# Patient Record
Sex: MALE | Race: WHITE | NOT HISPANIC OR LATINO | Employment: OTHER | URBAN - METROPOLITAN AREA
[De-identification: names, ages, dates, MRNs, and addresses within clinical notes are randomized per-mention and may not be internally consistent; named-entity substitution may affect disease eponyms.]

---

## 2019-11-06 ENCOUNTER — HOSPITAL ENCOUNTER (EMERGENCY)
Facility: MEDICAL CENTER | Age: 64
End: 2019-11-06
Attending: EMERGENCY MEDICINE

## 2019-11-06 VITALS
SYSTOLIC BLOOD PRESSURE: 158 MMHG | BODY MASS INDEX: 27.15 KG/M2 | OXYGEN SATURATION: 95 % | DIASTOLIC BLOOD PRESSURE: 93 MMHG | TEMPERATURE: 97.4 F | HEIGHT: 77 IN | WEIGHT: 229.94 LBS | HEART RATE: 58 BPM | RESPIRATION RATE: 16 BRPM

## 2019-11-06 DIAGNOSIS — M25.50 ARTHRALGIA, UNSPECIFIED JOINT: ICD-10-CM

## 2019-11-06 LAB
ALBUMIN SERPL BCP-MCNC: 4.4 G/DL (ref 3.2–4.9)
ALBUMIN/GLOB SERPL: 1.1 G/DL
ALP SERPL-CCNC: 90 U/L (ref 30–99)
ALT SERPL-CCNC: 13 U/L (ref 2–50)
ANION GAP SERPL CALC-SCNC: 8 MMOL/L (ref 0–11.9)
AST SERPL-CCNC: 18 U/L (ref 12–45)
BASOPHILS # BLD AUTO: 1.2 % (ref 0–1.8)
BASOPHILS # BLD: 0.1 K/UL (ref 0–0.12)
BILIRUB SERPL-MCNC: 0.6 MG/DL (ref 0.1–1.5)
BUN SERPL-MCNC: 11 MG/DL (ref 8–22)
CALCIUM SERPL-MCNC: 9.7 MG/DL (ref 8.5–10.5)
CHLORIDE SERPL-SCNC: 100 MMOL/L (ref 96–112)
CK SERPL-CCNC: 54 U/L (ref 0–154)
CO2 SERPL-SCNC: 25 MMOL/L (ref 20–33)
CREAT SERPL-MCNC: 0.84 MG/DL (ref 0.5–1.4)
CRP SERPL HS-MCNC: 2.83 MG/DL (ref 0–0.75)
EKG IMPRESSION: NORMAL
EOSINOPHIL # BLD AUTO: 0.36 K/UL (ref 0–0.51)
EOSINOPHIL NFR BLD: 4.2 % (ref 0–6.9)
ERYTHROCYTE [DISTWIDTH] IN BLOOD BY AUTOMATED COUNT: 41.5 FL (ref 35.9–50)
ERYTHROCYTE [SEDIMENTATION RATE] IN BLOOD BY WESTERGREN METHOD: 47 MM/HOUR (ref 0–20)
GLOBULIN SER CALC-MCNC: 4 G/DL (ref 1.9–3.5)
GLUCOSE SERPL-MCNC: 96 MG/DL (ref 65–99)
HCT VFR BLD AUTO: 47.5 % (ref 42–52)
HGB BLD-MCNC: 15.8 G/DL (ref 14–18)
IMM GRANULOCYTES # BLD AUTO: 0.04 K/UL (ref 0–0.11)
IMM GRANULOCYTES NFR BLD AUTO: 0.5 % (ref 0–0.9)
LYMPHOCYTES # BLD AUTO: 1.23 K/UL (ref 1–4.8)
LYMPHOCYTES NFR BLD: 14.4 % (ref 22–41)
MCH RBC QN AUTO: 31.3 PG (ref 27–33)
MCHC RBC AUTO-ENTMCNC: 33.3 G/DL (ref 33.7–35.3)
MCV RBC AUTO: 94.2 FL (ref 81.4–97.8)
MONOCYTES # BLD AUTO: 1.04 K/UL (ref 0–0.85)
MONOCYTES NFR BLD AUTO: 12.1 % (ref 0–13.4)
NEUTROPHILS # BLD AUTO: 5.8 K/UL (ref 1.82–7.42)
NEUTROPHILS NFR BLD: 67.6 % (ref 44–72)
NRBC # BLD AUTO: 0 K/UL
NRBC BLD-RTO: 0 /100 WBC
PLATELET # BLD AUTO: 349 K/UL (ref 164–446)
PMV BLD AUTO: 9.7 FL (ref 9–12.9)
POTASSIUM SERPL-SCNC: 4.4 MMOL/L (ref 3.6–5.5)
PROT SERPL-MCNC: 8.4 G/DL (ref 6–8.2)
RBC # BLD AUTO: 5.04 M/UL (ref 4.7–6.1)
SODIUM SERPL-SCNC: 133 MMOL/L (ref 135–145)
TROPONIN T SERPL-MCNC: <6 NG/L (ref 6–19)
TSH SERPL DL<=0.005 MIU/L-ACNC: 4.58 UIU/ML (ref 0.38–5.33)
WBC # BLD AUTO: 8.6 K/UL (ref 4.8–10.8)

## 2019-11-06 PROCEDURE — 82550 ASSAY OF CK (CPK): CPT

## 2019-11-06 PROCEDURE — 80053 COMPREHEN METABOLIC PANEL: CPT

## 2019-11-06 PROCEDURE — 84484 ASSAY OF TROPONIN QUANT: CPT

## 2019-11-06 PROCEDURE — 93005 ELECTROCARDIOGRAM TRACING: CPT

## 2019-11-06 PROCEDURE — 700111 HCHG RX REV CODE 636 W/ 250 OVERRIDE (IP): Performed by: EMERGENCY MEDICINE

## 2019-11-06 PROCEDURE — 86140 C-REACTIVE PROTEIN: CPT

## 2019-11-06 PROCEDURE — 84443 ASSAY THYROID STIM HORMONE: CPT

## 2019-11-06 PROCEDURE — 85025 COMPLETE CBC W/AUTO DIFF WBC: CPT

## 2019-11-06 PROCEDURE — 93005 ELECTROCARDIOGRAM TRACING: CPT | Performed by: EMERGENCY MEDICINE

## 2019-11-06 PROCEDURE — 85652 RBC SED RATE AUTOMATED: CPT

## 2019-11-06 PROCEDURE — 36415 COLL VENOUS BLD VENIPUNCTURE: CPT

## 2019-11-06 PROCEDURE — 99284 EMERGENCY DEPT VISIT MOD MDM: CPT

## 2019-11-06 RX ORDER — PREDNISONE 10 MG/1
15 TABLET ORAL ONCE
Status: COMPLETED | OUTPATIENT
Start: 2019-11-06 | End: 2019-11-06

## 2019-11-06 RX ORDER — PREDNISONE 10 MG/1
15 TABLET ORAL DAILY
Qty: 11 TAB | Refills: 0 | Status: SHIPPED | OUTPATIENT
Start: 2019-11-06 | End: 2019-11-13

## 2019-11-06 RX ADMIN — PREDNISONE 15 MG: 10 TABLET ORAL at 14:05

## 2019-11-06 SDOH — HEALTH STABILITY: MENTAL HEALTH: HOW OFTEN DO YOU HAVE 6 OR MORE DRINKS ON ONE OCCASION?: DAILY OR ALMOST DAILY

## 2019-11-06 SDOH — HEALTH STABILITY: MENTAL HEALTH: HOW MANY STANDARD DRINKS CONTAINING ALCOHOL DO YOU HAVE ON A TYPICAL DAY?: 1 OR 2

## 2019-11-06 ASSESSMENT — LIFESTYLE VARIABLES: DO YOU DRINK ALCOHOL: YES

## 2019-11-06 NOTE — ED NOTES
Discharge instructions given.  All questions answered.  Prescriptions given x1.  Pt to follow-up with PCP, return to ER if symptoms worsen as discussed.  Pt verbalized understanding.  All belongings with pt.  Pt ambulated to lobby.

## 2019-11-06 NOTE — ED TRIAGE NOTES
Ambulatory to triage from EKG with   Chief Complaint   Patient presents with   • Hip Pain   • Shoulder Pain   • Hand Pain   Above complaints x 1 months. Unable to get into PCP until January. States bilateral shoulder, hand, and hip pain. Tearful. Denies chest pain or SOB.

## 2019-11-06 NOTE — ED NOTES
Pt ambulated to yellow 64.  Agree with triage note.  Pt appears uncomfortable.  Pt placed on monitors and changed into gown.  ERP to see.

## 2019-11-06 NOTE — ED PROVIDER NOTES
ED Provider Note    ER PROVIDER NOTE      CHIEF COMPLAINT  Chief Complaint   Patient presents with   • Hip Pain   • Shoulder Pain   • Hand Pain       HPI  Sahun Daniels is a 64 y.o. male who presents to the emergency department complaining of bilateral shoulder and hip pain.  Patient reports of the last month he has had progressive pain to his hips and shoulders, that has become quite severe.  He denies any injury, denies any real acute changes just seems to be getting worse, he denies any focal weakness numbness or tingling he does sometimes get some pain into his hands.  Seems to be worse and more stiff in the mornings. he denies any fevers or chills.  No rashes.  No chest pain or shortness of breath.  No abdominal pain, nausea vomiting.  He denies any headaches or visual symptoms.  Patient states it is gotten to the point where he can barely raise his arms above his shoulders due to the pain    REVIEW OF SYSTEMS  Pertinent positives include bilateral hip and shoulder pain. Pertinent negatives include no chest pain or fever. See HPI for details. All other systems reviewed and are negative.    PAST MEDICAL HISTORY   has a past medical history of ACE-inhibitor cough (1/15/2013), Chest tightness, discomfort, or pressure (9/18/2012), HTN (hypertension) with goal to be determined (9/18/2012), and Palpitations (9/18/2012).    SURGICAL HISTORY   has a past surgical history that includes lumbar laminectomy diskectomy and knee arthroscopy.    FAMILY HISTORY  Family History   Problem Relation Age of Onset   • Heart Disease Brother    • Heart Disease Brother        SOCIAL HISTORY  Social History     Socioeconomic History   • Marital status:      Spouse name: Not on file   • Number of children: Not on file   • Years of education: Not on file   • Highest education level: Not on file   Occupational History   • Not on file   Social Needs   • Financial resource strain: Not on file   • Food insecurity:     Worry: Not on file  "    Inability: Not on file   • Transportation needs:     Medical: Not on file     Non-medical: Not on file   Tobacco Use   • Smoking status: Former Smoker     Packs/day: 0.50     Types: Cigarettes     Last attempt to quit: 1975     Years since quittin.1   • Smokeless tobacco: Current User     Types: Chew   Substance and Sexual Activity   • Alcohol use: Yes     Drinks per session: 1 or 2     Binge frequency: Daily or almost daily   • Drug use: Yes     Types: Inhaled     Comment: pot   • Sexual activity: Not on file   Lifestyle   • Physical activity:     Days per week: Not on file     Minutes per session: Not on file   • Stress: Not on file   Relationships   • Social connections:     Talks on phone: Not on file     Gets together: Not on file     Attends [employer] service: Not on file     Active member of club or organization: Not on file     Attends meetings of clubs or organizations: Not on file     Relationship status: Not on file   • Intimate partner violence:     Fear of current or ex partner: Not on file     Emotionally abused: Not on file     Physically abused: Not on file     Forced sexual activity: Not on file   Other Topics Concern   • Not on file   Social History Narrative   • Not on file      Social History     Substance and Sexual Activity   Drug Use Yes   • Types: Inhaled    Comment: pot       CURRENT MEDICATIONS  Home Medications     Reviewed by Moon Pugh R.N. (Registered Nurse) on 19 at 1130  Med List Status: Complete   Medication Last Dose Status   hydrochlorothiazide (HYDRODIURIL) 25 MG TABS not taking Active                ALLERGIES  No Known Allergies    PHYSICAL EXAM  VITAL SIGNS: /85   Pulse (!) 57   Temp 36.3 °C (97.4 °F) (Oral)   Resp 16   Ht 1.956 m (6' 5\")   Wt 104.3 kg (229 lb 15 oz)   SpO2 94%   BMI 27.27 kg/m²   Pulse ox interpretation: I interpret this pulse ox as normal.    Constitutional: Alert tearful, uncomfortable  HENT: No signs of trauma, " Bilateral external ears normal, Nose normal.   Eyes: Pupils are equal and reactive, Conjunctiva normal, Non-icteric.   Neck: Normal range of motion, No tenderness, Supple, No stridor.   Lymphatic: No lymphadenopathy noted.   Cardiovascular: Regular rate and rhythm, no murmurs.   Thorax & Lungs: Normal breath sounds, No respiratory distress, No wheezing, No chest tenderness.   Abdomen: Bowel sounds normal, Soft, No tenderness, No masses, No pulsatile masses. No peritoneal signs.  Skin: Warm, Dry, No erythema, No rash.   Back: No bony tenderness, No CVA tenderness.   Extremities: Intact distal pulses, No edema, No tenderness, No cyanosis,   Musculoskeletal: Passive range of motion through wrist and elbows without pain, patient has pain with passive and active range of motion in shoulders when trying to fully Abduct, full range of motion, passive and active and knees and ankles, some pain with passive and active range of motion in hips, good range of motion in all major joints. No tenderness to palpation or major deformities noted.   Neurologic: Alert , Normal motor function, Normal sensory function, No focal deficits noted.   Psychiatric: Affect normal, Judgment normal, Mood normal.     DIAGNOSTIC STUDIES / PROCEDURES    Results for orders placed or performed during the hospital encounter of 11/06/19   CBC WITH DIFFERENTIAL   Result Value Ref Range    WBC 8.6 4.8 - 10.8 K/uL    RBC 5.04 4.70 - 6.10 M/uL    Hemoglobin 15.8 14.0 - 18.0 g/dL    Hematocrit 47.5 42.0 - 52.0 %    MCV 94.2 81.4 - 97.8 fL    MCH 31.3 27.0 - 33.0 pg    MCHC 33.3 (L) 33.7 - 35.3 g/dL    RDW 41.5 35.9 - 50.0 fL    Platelet Count 349 164 - 446 K/uL    MPV 9.7 9.0 - 12.9 fL    Neutrophils-Polys 67.60 44.00 - 72.00 %    Lymphocytes 14.40 (L) 22.00 - 41.00 %    Monocytes 12.10 0.00 - 13.40 %    Eosinophils 4.20 0.00 - 6.90 %    Basophils 1.20 0.00 - 1.80 %    Immature Granulocytes 0.50 0.00 - 0.90 %    Nucleated RBC 0.00 /100 WBC    Neutrophils  (Absolute) 5.80 1.82 - 7.42 K/uL    Lymphs (Absolute) 1.23 1.00 - 4.80 K/uL    Monos (Absolute) 1.04 (H) 0.00 - 0.85 K/uL    Eos (Absolute) 0.36 0.00 - 0.51 K/uL    Baso (Absolute) 0.10 0.00 - 0.12 K/uL    Immature Granulocytes (abs) 0.04 0.00 - 0.11 K/uL    NRBC (Absolute) 0.00 K/uL   COMP METABOLIC PANEL   Result Value Ref Range    Sodium 133 (L) 135 - 145 mmol/L    Potassium 4.4 3.6 - 5.5 mmol/L    Chloride 100 96 - 112 mmol/L    Co2 25 20 - 33 mmol/L    Anion Gap 8.0 0.0 - 11.9    Glucose 96 65 - 99 mg/dL    Bun 11 8 - 22 mg/dL    Creatinine 0.84 0.50 - 1.40 mg/dL    Calcium 9.7 8.5 - 10.5 mg/dL    AST(SGOT) 18 12 - 45 U/L    ALT(SGPT) 13 2 - 50 U/L    Alkaline Phosphatase 90 30 - 99 U/L    Total Bilirubin 0.6 0.1 - 1.5 mg/dL    Albumin 4.4 3.2 - 4.9 g/dL    Total Protein 8.4 (H) 6.0 - 8.2 g/dL    Globulin 4.0 (H) 1.9 - 3.5 g/dL    A-G Ratio 1.1 g/dL   TROPONIN   Result Value Ref Range    Troponin T <6 6 - 19 ng/L   WESTERGREN SED RATE   Result Value Ref Range    Sed Rate Westergren 47 (H) 0 - 20 mm/hour   CRP QUANTITIVE (NON-CARDIAC)   Result Value Ref Range    Stat C-Reactive Protein 2.83 (H) 0.00 - 0.75 mg/dL   CREATINE KINASE   Result Value Ref Range    CPK Total 54 0 - 154 U/L   TSH   Result Value Ref Range    TSH 4.580 0.380 - 5.330 uIU/mL   ESTIMATED GFR   Result Value Ref Range    GFR If African American >60 >60 mL/min/1.73 m 2    GFR If Non African American >60 >60 mL/min/1.73 m 2   EKG (NOW)   Result Value Ref Range    Report       [facility] Emergency Dept.    Test Date:  2019  Pt Name:    ALEX MARIE                 Department: ER  MRN:        [id number]                      Room:  Gender:     Male                         Technician: [id number]  :        1955                   Requested By:ER TRIAGE PROTOCOL  Order #:    [id number]                    Reading MD:    Measurements  Intervals                                Axis  Rate:       50                           P:           50  ND:         156                          QRS:        25  QRSD:       103                          T:          52  QT:         452  QTc:        413    Interpretive Statements  Sinus bradycardia  Atrial premature complex  Nonspecific T abnormalities, lateral leads  No previous ECG available for comparison         RADIOLOGY  No orders to display     The radiologist's interpretation of all radiological studies have been reviewed by me.    COURSE & MEDICAL DECISION MAKING  Nursing notes, VS, PMSFHx reviewed in chart.    12:13 PM Patient seen and examined at bedside.  Ordered for CBC, CMP, CK, ESR, CRP, ecg, trop to evaluate his symptoms.     1:53 PM  Patient reevaluated he is much more comfortable at this time, blood pressure is improved, discussed results and plan for discharge        Decision Making:  This is a 64 y.o. male presented with bilateral hip and shoulder pain.  He does have some morning stiffness as well as difficulty abducting his shoulders due to the pain, symptoms all the seem to be consistent with polymyalgia rheumatica which would certainly be more common in his age group.  His labs do show some evidence and inflammatory markers, given these I will start on low-dose prednisone he will follow-up with his primary care.  He has no focal findings to suggest central neurologic process at this time.  In the longer duration of his symptoms and overall well appearance with really argue against infectious process as well.  The patient has no visual symptoms or headaches to suggest GCA as component there is no other metabolic disturbance detected.  Patient does have primary care follow-up established in [place], and will start him on low-dose prednisone over the next week and he will follow-up with his primary care doctor for further testing and further, long-term treatment     The patient will return for new or worsening symptoms and is stable at the time of discharge.    The patient is referred to a  primary physician for blood pressure management, diabetic screening, and for all other preventative health concerns.        DISPOSITION:  Patient will be discharged home in stable condition.    FOLLOW UP:  With your primary care doctor in [place]    In 1 week        OUTPATIENT MEDICATIONS:  New Prescriptions    PREDNISONE (DELTASONE) 10 MG TAB    Take 1.5 Tabs by mouth every day for 7 days.         FINAL IMPRESSION  1. Arthralgia, unspecified joint         The note accurately reflects work and decisions made by me.  Adrian Doty  11/6/2019  1:57 PM